# Patient Record
Sex: MALE | Race: WHITE | NOT HISPANIC OR LATINO | Employment: UNEMPLOYED
[De-identification: names, ages, dates, MRNs, and addresses within clinical notes are randomized per-mention and may not be internally consistent; named-entity substitution may affect disease eponyms.]

---
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
## 2019-03-21 ENCOUNTER — RECORDS - HEALTHEAST (OUTPATIENT)
Dept: ADMINISTRATIVE | Facility: OTHER | Age: 19
End: 2019-03-21

## 2020-03-15 ENCOUNTER — RECORDS - HEALTHEAST (OUTPATIENT)
Dept: ADMINISTRATIVE | Facility: OTHER | Age: 20
End: 2020-03-15

## 2021-05-31 VITALS — WEIGHT: 154.56 LBS | HEIGHT: 67 IN | BODY MASS INDEX: 24.26 KG/M2

## 2021-05-31 VITALS — BODY MASS INDEX: 24.33 KG/M2 | HEIGHT: 68 IN

## 2021-05-31 VITALS — BODY MASS INDEX: 25.06 KG/M2 | WEIGHT: 160 LBS

## 2021-05-31 VITALS — BODY MASS INDEX: 23.7 KG/M2 | HEIGHT: 68 IN | WEIGHT: 156.38 LBS

## 2021-06-01 ENCOUNTER — OFFICE VISIT - HEALTHEAST (OUTPATIENT)
Dept: FAMILY MEDICINE | Facility: CLINIC | Age: 21
End: 2021-06-01

## 2021-06-01 VITALS — BODY MASS INDEX: 22.4 KG/M2 | WEIGHT: 156.44 LBS | HEIGHT: 70 IN

## 2021-06-01 VITALS — HEIGHT: 68 IN | WEIGHT: 154.06 LBS | BODY MASS INDEX: 23.35 KG/M2

## 2021-06-01 DIAGNOSIS — J02.9 ACUTE PHARYNGITIS, UNSPECIFIED ETIOLOGY: ICD-10-CM

## 2021-06-01 RX ORDER — FINASTERIDE 5 MG/1
TABLET, FILM COATED ORAL
Status: SHIPPED | COMMUNITY
Start: 2021-05-21

## 2021-06-13 NOTE — PROGRESS NOTES
"Chief Complaint   Patient presents with     Cough     cold Sx 9/17 then cough that has not gone away, coughing up green phlegm        HPI: Very pleasant 17-year-old male presents to the clinic with cough and sputum production for 6-8 weeks duration of mild intensity.  Interestingly, the mother is with the patient and notes that he had similar episode of coughing and sputum production for a prolonged period of time about 1 year ago.  He denies tobacco exposure.    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  Mother is concerned about mold in the gym.    ROS: No fever chills nausea vomiting or diarrhea.  All other systems reviewed in detail and were negative.    SH: The Patient's  reports that he has never smoked. He has never used smokeless tobacco.     FH: The Patient's family history was completely reviewed and completely negative according to the patient and the patient questionnaire.    Meds:  John has a current medication list which includes the following prescription(s): azithromycin and sulfacetamide sodium-sulfur, and the following Facility-Administered Medications: ceftriaxone.    O:  /64  Pulse 64  Temp 98.4  F (36.9  C)  Resp 16  Ht 5' 7\" (1.702 m)  Wt 154 lb 9 oz (70.1 kg)  BMI 24.21 kg/m2   Constitutional:    --Vitals as above  --No acute distress  Eyes-  --Sclera noninjected  --Lids and conjunctiva normal  ENT-  --TMs erythematous bilaterally  --Sclera noninjected  --Pharynx not erythematous  Neck-  --Neck supple with no cervical lymphadenopathy  Lungs-  --wheezing bilaterally particularly in the bases  Heart-  --Regular rate and rhythm  Skin-  --Pink and dry  Psych-  --Alert and oriented  --Normal affect    Imaging: I personally visualized and reviewed the chest x-ray.  The bony architecture is intact there are focal infiltrates in the bases with air bronchograms in the right, the cardiac silhouettes within normal limits and the costophrenic angles are sharp    A/P: "   1. Wheezing  - XR Chest PA and Lateral; Future    2. CAP (community acquired pneumonia)  -Zithromax  - cefTRIAXone injection 1 g (ROCEPHIN); Inject 1 g into the shoulder, thigh, or buttocks once.  -If not improving would consider further workup including CT scan and referral to allergist.    ADDENDUM: Patient was given an injection of Rocephin and shortly thereafter had an episode of tonic-clonic activity and hyperventilation.  He recovered after approximately 10 seconds and did not wet his pants or bite his tongue.  When he recovered he was able to answer questions appropriately did not appear to be postictal.  EMS was summoned paramedics evaluated the patient blood sugar was 89.  The patient declined transport to the hospital for evaluation but mother will taken by car.  Case was discussed with Dr. Garcia at [facility] emergency department.    Approximate 65 minutes total spent in complete patient care.

## 2021-06-14 NOTE — PROGRESS NOTES
"Chief Complaint   Patient presents with     Follow-up       HPI: Kevon comes in today for recheck of community acquired pneumonia.  He is breathing better, feeling more energetic and completed his antibiotic course.    ROS: Minimal wheezing with deep breaths, no fever chills    SH: The Patient's  reports that he has never smoked. He has never used smokeless tobacco.     FH: The Patient's family history is not on file.    Meds:  John has a current medication list which includes the following prescription(s): sulfacetamide sodium-sulfur.    O:  /62  Pulse 60  Temp 98  F (36.7  C)  Ht 5' 8\" (1.727 m)  Wt 156 lb 6 oz (70.9 kg)  BMI 23.78 kg/m2  Examination shows patient alert and conversant no acute distress  Lungs have minimal wheezing deep expiration in the bases much improved from 1 week ago  Heart regular rate and rhythm  Skin Pink and dry    A/P:   1. Community acquired pneumonia, unspecified laterality  Resolving  -Continue increased rest  -May return to normal activity                                          "

## 2021-06-16 PROBLEM — M62.82 RHABDOMYOLYSIS: Status: ACTIVE | Noted: 2018-02-05

## 2021-06-16 NOTE — PROGRESS NOTES
"Chief Complaint   Patient presents with     Follow-up     ER 2/4, feeling better       HPI: Kevon comes in today for recheck.  He was discharged 7 days ago from [facility] after being diagnosed with rhabdomyolysis with a CK level of greater than 105,000 at one point.  He also had mildly elevated LFTs.    ROS: Mild muscle pains which are improving, normal urine with no hematuria    SH: The Patient's  reports that he has never smoked. He has never used smokeless tobacco. He reports that he drinks alcohol. He reports that he uses illicit drugs, including Marijuana.     FH: The Patient's family history is not on file.    Meds:  John has a current medication list which includes the following prescription(s): sulfacetamide sodium-sulfur.    O:  /60  Pulse 72  Resp 16  Ht 5' 10\" (1.778 m)  Wt 156 lb 7 oz (71 kg)  SpO2 97%  BMI 22.45 kg/m2  Patient alert conversant no acute distress  Skin Pink and dry    A/P:   1. Non-traumatic rhabdomyolysis  -Literature reviewed and shows elevated liver transaminases are common see reference below  -Patient may resume light workouts  - Comprehensive Metabolic Panel  - HM2(CBC w/o Differential)  - CK Total      J Med Toxicol. 2010 Sep;6(3):294-300. doi: 10.1007/[id number].  Liver aminotransferases are elevated with rhabdomyolysis in the absence of significant liver injury.                                      "

## 2021-06-19 NOTE — PROGRESS NOTES
18-year-old male presents for wellness exam.  Recently returned from [place].  He describes no concerns.  He is completely healed from his rhabdomyolysis that occurred back in February.  CK levels were markedly elevated at time.    Assessment:      Healthy male exam.      Plan:       All questions answered.  Discussed healthy lifestyle modifications.     Subjective:      John Olguin is a 18 y.o. male who presents for an annual exam. The patient reports that there is not domestic violence in his life.     Healthy Habits:   Regular Exercise: Yes  Sunscreen Use: Yes  Healthy Diet: Yes  Dental Visits Regularly: Yes  Seat Belt: Yes  Sexually active: Yes  Monthly Self Testicular Exams:  No  Hemoccults: No  Flex Sig: No  Colonoscopy: No  Lipid Profile: Yes  Glucose Screen: Yes  Prevention of Osteoporosis: Yes  Last Dexa: No  Guns at Home:  Yes  Gun safe/class:  Yes      Immunization History   Administered Date(s) Administered     DTaP, historic 1999, 1999, 1999, 11/28/2000, 08/11/2004     Hep A, historic 08/08/2007, 10/17/2008     Hep B, historic 1999, 1999, 11/28/2000     HiB, historic,unspecified 1999, 1999, 11/28/2000     IPV 1999, 1999, 11/26/2000, 08/11/2004     Influenza, Seasonal, Inj PF IIV3 10/17/2008     MMR 07/11/2000, 08/11/2004     Meningococcal MCV4 Conjugate,Unspecified 07/18/2011     Meningococcal MCV4P 07/18/2011     Pneumo Conj 13-V (2010&after) 11/28/2000     Tdap 07/18/2011     Varicella 08/30/2000, 08/08/2007     Immunization status: up to date and documented, due today.    No exam data present     Current Outpatient Prescriptions   Medication Sig Dispense Refill     sulfacetamide sodium-sulfur 10-5 % (w/w) Clsr Apply 1 application topically daily as needed (use to acne areas daily as needed in the shower and rinse well).       No current facility-administered medications for this visit.      Past Medical History:   Diagnosis Date     Pneumonia  "11/2017     No past surgical history on file.  Review of patient's allergies indicates no known allergies.  No family history on file.  Social History     Social History     Marital status: Single     Spouse name: N/A     Number of children: N/A     Years of education: N/A     Occupational History     Not on file.     Social History Main Topics     Smoking status: Never Smoker     Smokeless tobacco: Never Used     Alcohol use Yes      Comment: Rarely      Drug use: Yes     Special: Marijuana     Sexual activity: Not on file     Other Topics Concern     Not on file     Social History Narrative       Review of Systems  Review of Systems          Objective:     Vitals:    07/25/18 1107   BP: 110/70   Pulse: (!) 58   Resp: 18   SpO2: 98%   Weight: 154 lb 1 oz (69.9 kg)   Height: 5' 7.5\" (1.715 m)     Body mass index is 23.77 kg/(m^2).    Physical  Physical Exam     Constitutional:    --Vitals as above  --No acute distress  Eyes-  --Sclera noninjected  --Lids and conjunctiva normal  ENT-  --TMs clear  --Sclera noninjected  --Pharynx not erythematous  Neck-  --Neck supple with no cervical lymphadenopathy  Lungs-  --Clear to Auscultation  Heart-  --Regular rate and rhythm  Abdomen--  --Soft, non-tender, non-distended  Skin-  --Pink and dry  Psych-  --Alert and oriented  --Normal affect        "

## 2021-06-25 NOTE — PROGRESS NOTES
John Olguin is a 20 y.o. male who is being evaluated via a billable telephone visit.      What phone number would you like to be contacted at? [phone]  How would you like to obtain your AVS? AVS Preference: [facility].    Assessment & Plan     Acute pharyngitis, unspecified etiology  -Discussed his symptoms are most likely secondary to either postnasal drip or viral pharyngitis.  If his symptoms do not start resolving over the next several days or if they start worsening he will start antibiotics as listed below.  If he still feels unwell after taking the antibiotics he will follow-up here in person for a follow-up visit.  - amoxicillin (AMOXIL) 500 MG capsule  Dispense: 20 capsule; Refill: 0             Return if symptoms worsen or fail to improve.    Rose Clark MD  [facility]   John Olguin is 20 y.o. and presents today for the following health issues   HPI     Two nights ago he had pain with his left tonsil, hard to swallow, swelling and pain. He had a headache as well at the time. This morning he had some right pain as well, some white phlegm as well. He has felt feverish as well. He is eating ok. His energy level, he's been tired. He has had no sick contacts. He works part time washing dogs. He's around some people. No history of strep throat in the past. He is around some people but limited.     He's worried about strep throat/tonsillitis type picture. He's been taking advil to help with the pain.         Review of Systems    Per above      Objective       Vitals:  No vitals were obtained today due to virtual visit.    Physical Exam    None done, telephone visit.           Phone call duration: 10 minutes

## 2021-06-26 ENCOUNTER — HEALTH MAINTENANCE LETTER (OUTPATIENT)
Age: 21
End: 2021-06-26

## 2021-10-16 ENCOUNTER — HEALTH MAINTENANCE LETTER (OUTPATIENT)
Age: 21
End: 2021-10-16

## 2022-07-17 ENCOUNTER — HEALTH MAINTENANCE LETTER (OUTPATIENT)
Age: 22
End: 2022-07-17

## 2022-07-29 ENCOUNTER — TRANSFERRED RECORDS (OUTPATIENT)
Dept: HEALTH INFORMATION MANAGEMENT | Facility: CLINIC | Age: 22
End: 2022-07-29

## 2022-09-25 ENCOUNTER — HEALTH MAINTENANCE LETTER (OUTPATIENT)
Age: 22
End: 2022-09-25

## 2023-06-02 ENCOUNTER — OFFICE VISIT (OUTPATIENT)
Dept: FAMILY MEDICINE | Facility: CLINIC | Age: 23
End: 2023-06-02
Payer: COMMERCIAL

## 2023-06-02 ENCOUNTER — NURSE TRIAGE (OUTPATIENT)
Dept: NURSING | Facility: CLINIC | Age: 23
End: 2023-06-02
Payer: COMMERCIAL

## 2023-06-02 VITALS
BODY MASS INDEX: 25.62 KG/M2 | SYSTOLIC BLOOD PRESSURE: 122 MMHG | RESPIRATION RATE: 14 BRPM | WEIGHT: 166 LBS | TEMPERATURE: 98.1 F | HEART RATE: 65 BPM | OXYGEN SATURATION: 98 % | DIASTOLIC BLOOD PRESSURE: 77 MMHG

## 2023-06-02 DIAGNOSIS — A69.20 LYME DISEASE: Primary | ICD-10-CM

## 2023-06-02 PROCEDURE — 99213 OFFICE O/P EST LOW 20 MIN: CPT | Performed by: FAMILY MEDICINE

## 2023-06-02 RX ORDER — DOXYCYCLINE 100 MG/1
100 CAPSULE ORAL 2 TIMES DAILY
Qty: 42 CAPSULE | Refills: 0 | Status: SHIPPED | OUTPATIENT
Start: 2023-06-02 | End: 2023-06-23

## 2023-06-02 NOTE — TELEPHONE ENCOUNTER
"Triage Call:    Caller: Mother     Pt mother Laurie calling in CC on file.    Pt. found deer tick on him almost 2 weeks ago. Now last night he found a rash in the area of the bite and a faint target pattern or bulls eye. Bulls eye is slightly more prominent today. No other s/sx present.Bite is to inner thigh.     Pt will go to [facility] in [place] for evaluation now.         Protocol Recommended Disposition: See in Office today     Caller verbalized understanding of instructions and questions answered.      Cecile Munoz, RN, MN, PHN on 6/2/2023 at 10:25 AM  [facility]  RN utilized sound nursing judgement based on facility triage protocols during this encounter.      Reason for Disposition    Red ring or bull's-eye rash occurs around a deer tick bite    Additional Information    Negative: Not a tick bite    Negative: Patient sounds very sick or weak to the triager    Negative: Fever or severe headache occurs, 2 to 14 days following the bite    Negative: Widespread rash occurs, 2 to 14 days following the bite    Negative: Can't remove live tick (after using Care Advice)    Negative: Fever and spreading red area or streak    Negative: Fever and area is very tender to touch    Negative: Red streak or red line and length > 2 inches (5 cm)    Negative: Red or very tender (to touch) area and started over 24 hours after the bite    Answer Assessment - Initial Assessment Questions  1. TYPE of TICK: \"Is it a wood tick or a deer tick?\" (e.g., deer tick, wood tick; unsure)      Deer     2. SIZE of TICK: \"How big is the tick?\" (e.g., size of poppy seed, apple seed, watermelon seed; unsure) Note: Deer ticks can be the size of a poppy seed (nymph) or an apple seed (adult).        Nymph size     3. ENGORGED: \"Did the tick look flat or engorged (full, swollen)?\" (e.g., flat, engorged; unsure)      Unknown     4. LOCATION: \"Where is the tick bite located?\"       Inner thigh     5. ONSET: \"How long do you think the " "tick was attached before you removed it?\" (e.g., 5 hours, 2 days)       12-24 hours     6. APPEARANCE of BITE or RASH: \"What does the site look like?\"      Slight rash at site very faint target     7. PREGNANCY: \"Is there any chance you are pregnant?\" \"When was your last menstrual period?\"    Protocols used: TICK BITE-A-OH      "

## 2023-06-02 NOTE — PROGRESS NOTES
Clinical Decision Making:    At the end of the encounter, I discussed results, diagnosis, medications. Discussed red flags for immediate return to clinic/ER, as well as indications for follow up if no improvement. Patient understood and agreed to plan. Patient was stable for discharge.      ICD-10-CM    1. Lyme disease  A69.20 doxycycline hyclate (VIBRAMYCIN) 100 MG capsule        Classic bull's-eye rash  Treating for Lyme disease with doxycycline twice daily for 21 days  Take with food  Discussed sun sensitivity and avoiding the sun  Follow-up as needed      There are no Patient Instructions on file for this visit.   Return in about 3 weeks (around 6/23/2023), or if symptoms worsen or fail to improve.      chief complaint    HPI:  John Olguin is a 22 year old male who presents today complaining of a rash on his right inner thigh that he noticed couple days ago.  It is getting bigger and looks like a bull's-eye rash.  May 18, about 3 weeks ago, he removed a deer tick from the site.  The tick was flat and not engorged when he removed it.  He just noticed the rash 2 days ago.  He has had no fevers, headaches, joint aches.    History obtained from mother and the patient.    Problem List:  2018-02: Rhabdomyolysis  Elevated transaminase level      Past Medical History:   Diagnosis Date     Pneumonia 11/2017       Social History     Tobacco Use     Smoking status: Never     Smokeless tobacco: Never   Vaping Use     Vaping status: Not on file   Substance Use Topics     Alcohol use: Yes     Comment: Alcoholic Drinks/day: Rarely        Review of systems  negative except listed in HPI    Vitals:    06/02/23 1209   BP: 122/77   Pulse: 65   Resp: 14   Temp: 98.1  F (36.7  C)   TempSrc: Oral   SpO2: 98%   Weight: 75.3 kg (166 lb)       Physical Exam  Vitals noted and within normal limits  In general he is alert, oriented, and in no acute distress  Right upper inner thigh with a central erythematous area approximately 2 cm x 4  cm with then an area of clearing and then an erythematous ring

## 2023-08-05 ENCOUNTER — HEALTH MAINTENANCE LETTER (OUTPATIENT)
Age: 23
End: 2023-08-05

## 2023-11-02 ENCOUNTER — TRANSFERRED RECORDS (OUTPATIENT)
Dept: HEALTH INFORMATION MANAGEMENT | Facility: CLINIC | Age: 23
End: 2023-11-02
Payer: COMMERCIAL

## 2024-09-28 ENCOUNTER — HEALTH MAINTENANCE LETTER (OUTPATIENT)
Age: 24
End: 2024-09-28